# Patient Record
Sex: MALE | Race: WHITE | NOT HISPANIC OR LATINO | Employment: OTHER | URBAN - NONMETROPOLITAN AREA
[De-identification: names, ages, dates, MRNs, and addresses within clinical notes are randomized per-mention and may not be internally consistent; named-entity substitution may affect disease eponyms.]

---

## 2025-03-15 ENCOUNTER — APPOINTMENT (OUTPATIENT)
Dept: RADIOLOGY | Facility: HOSPITAL | Age: 65
End: 2025-03-15
Payer: MEDICARE

## 2025-03-15 ENCOUNTER — HOSPITAL ENCOUNTER (EMERGENCY)
Facility: HOSPITAL | Age: 65
Discharge: HOME | End: 2025-03-16
Attending: STUDENT IN AN ORGANIZED HEALTH CARE EDUCATION/TRAINING PROGRAM
Payer: MEDICARE

## 2025-03-15 ENCOUNTER — OFFICE VISIT (OUTPATIENT)
Dept: URGENT CARE | Age: 65
End: 2025-03-15
Payer: MEDICARE

## 2025-03-15 VITALS
TEMPERATURE: 97.9 F | OXYGEN SATURATION: 98 % | HEIGHT: 74 IN | BODY MASS INDEX: 21.82 KG/M2 | HEART RATE: 58 BPM | DIASTOLIC BLOOD PRESSURE: 69 MMHG | RESPIRATION RATE: 14 BRPM | WEIGHT: 170 LBS | SYSTOLIC BLOOD PRESSURE: 123 MMHG

## 2025-03-15 VITALS
HEIGHT: 74 IN | WEIGHT: 170 LBS | DIASTOLIC BLOOD PRESSURE: 87 MMHG | HEART RATE: 65 BPM | BODY MASS INDEX: 21.82 KG/M2 | SYSTOLIC BLOOD PRESSURE: 127 MMHG | OXYGEN SATURATION: 100 % | TEMPERATURE: 98.3 F | RESPIRATION RATE: 18 BRPM

## 2025-03-15 DIAGNOSIS — S09.90XA INJURY OF HEAD, INITIAL ENCOUNTER: Primary | ICD-10-CM

## 2025-03-15 DIAGNOSIS — S09.90XA CLOSED HEAD INJURY, INITIAL ENCOUNTER: Primary | ICD-10-CM

## 2025-03-15 PROCEDURE — 70450 CT HEAD/BRAIN W/O DYE: CPT | Performed by: RADIOLOGY

## 2025-03-15 PROCEDURE — 90471 IMMUNIZATION ADMIN: CPT | Performed by: HEALTH CARE PROVIDER

## 2025-03-15 PROCEDURE — 12011 RPR F/E/E/N/L/M 2.5 CM/<: CPT | Performed by: HEALTH CARE PROVIDER

## 2025-03-15 PROCEDURE — 90715 TDAP VACCINE 7 YRS/> IM: CPT | Performed by: HEALTH CARE PROVIDER

## 2025-03-15 PROCEDURE — 70450 CT HEAD/BRAIN W/O DYE: CPT

## 2025-03-15 PROCEDURE — 2500000004 HC RX 250 GENERAL PHARMACY W/ HCPCS (ALT 636 FOR OP/ED): Performed by: HEALTH CARE PROVIDER

## 2025-03-15 PROCEDURE — 99284 EMERGENCY DEPT VISIT MOD MDM: CPT | Mod: 25 | Performed by: STUDENT IN AN ORGANIZED HEALTH CARE EDUCATION/TRAINING PROGRAM

## 2025-03-15 PROCEDURE — 99213 OFFICE O/P EST LOW 20 MIN: CPT

## 2025-03-15 PROCEDURE — 1159F MED LIST DOCD IN RCRD: CPT

## 2025-03-15 PROCEDURE — 1160F RVW MEDS BY RX/DR IN RCRD: CPT

## 2025-03-15 RX ORDER — LIDOCAINE HYDROCHLORIDE AND EPINEPHRINE 10; 10 UG/ML; MG/ML
INJECTION, SOLUTION INFILTRATION; PERINEURAL
Status: DISCONTINUED
Start: 2025-03-15 | End: 2025-03-16 | Stop reason: HOSPADM

## 2025-03-15 RX ADMIN — TETANUS TOXOID, REDUCED DIPHTHERIA TOXOID AND ACELLULAR PERTUSSIS VACCINE, ADSORBED 0.5 ML: 5; 2.5; 8; 8; 2.5 SUSPENSION INTRAMUSCULAR at 16:51

## 2025-03-15 ASSESSMENT — ENCOUNTER SYMPTOMS
SHORTNESS OF BREATH: 0
LOSS OF SENSATION IN FEET: 0
FATIGUE: 0
VOMITING: 0
LIGHT-HEADEDNESS: 0
WOUND: 1
SORE THROAT: 0
DEPRESSION: 0
COUGH: 0
FEVER: 0
DIARRHEA: 0
FACIAL ASYMMETRY: 0
WEAKNESS: 0
DIZZINESS: 0
HEADACHES: 0
OCCASIONAL FEELINGS OF UNSTEADINESS: 0
NAUSEA: 1
NUMBNESS: 0
CHILLS: 0
EYE PAIN: 0

## 2025-03-15 ASSESSMENT — PAIN - FUNCTIONAL ASSESSMENT
PAIN_FUNCTIONAL_ASSESSMENT: 0-10
PAIN_FUNCTIONAL_ASSESSMENT: 0-10

## 2025-03-15 ASSESSMENT — PAIN SCALES - GENERAL
PAINLEVEL_OUTOF10: 0 - NO PAIN
PAINLEVEL_OUTOF10: 0 - NO PAIN
PAINLEVEL_OUTOF10: 3
PAINLEVEL_OUTOF10: 0 - NO PAIN

## 2025-03-15 ASSESSMENT — PAIN DESCRIPTION - LOCATION: LOCATION: HEAD

## 2025-03-15 ASSESSMENT — COLUMBIA-SUICIDE SEVERITY RATING SCALE - C-SSRS
1. IN THE PAST MONTH, HAVE YOU WISHED YOU WERE DEAD OR WISHED YOU COULD GO TO SLEEP AND NOT WAKE UP?: NO
2. HAVE YOU ACTUALLY HAD ANY THOUGHTS OF KILLING YOURSELF?: NO
6. HAVE YOU EVER DONE ANYTHING, STARTED TO DO ANYTHING, OR PREPARED TO DO ANYTHING TO END YOUR LIFE?: NO

## 2025-03-15 ASSESSMENT — PAIN DESCRIPTION - DESCRIPTORS: DESCRIPTORS: SORE

## 2025-03-15 ASSESSMENT — PAIN DESCRIPTION - ORIENTATION: ORIENTATION: LEFT

## 2025-03-15 NOTE — ED TRIAGE NOTES
Pt states he had a few beers last night, and woke up around 0530 this AM, tripped over one of his tall bar chairs on the way to the bathroom. Pt believes he hit his head on the bar stool. Pt has laceration to L forehead and L ear. Pt states having no LOC. Pt is not on any blood thinners

## 2025-03-15 NOTE — PROGRESS NOTES
"Subjective   Patient ID: Ifeanyi Iraheta is a 65 y.o. male. They present today with a chief complaint of Injury (Tripped over chair this morning and hit head).    History of Present Illness  Patient is a 65-year-old male presenting to the clinic with complaints of a head injury.  Patient states that around 2:30 AM he was at the bar.  Patient states he had \"a couple drinks\".  Patient states he left the bar at around 2:30 AM made it home and around 3 AM.  Patient states that he was watching TV while sitting on a chair in the kitchen.  Patient states he fell asleep in the chair for around 2 hours.  Patient states he was abruptly woken up at around 5 AM with nausea.  Patient states he tried to run to the bathroom and had a mechanical fall over the stool and hit his head on one of the stool chairs.  Patient has a laceration about an inch and a half over the left forehead.  Patient did have some bleeding that is now clotted off.  No active bleeding in the clinic.  Patient denies any headache or dizziness.  Patient believes it was a mechanical fall.  Patient denies any passing out.  Patient denies any numbness, tingling or weakness of the arms or legs.  Patient denies any visual dysfunction.      History provided by:  Patient  Injury  Associated symptoms: nausea    Associated symptoms: no chest pain, no cough, no diarrhea, no fatigue, no fever, no headaches, no rash, no shortness of breath, no sore throat and no vomiting        Past Medical History  Allergies as of 03/15/2025    (No Known Allergies)       (Not in a hospital admission)       History reviewed. No pertinent past medical history.    History reviewed. No pertinent surgical history.     reports that he has never smoked. He has never used smokeless tobacco. He reports current alcohol use. He reports that he does not use drugs.    Review of Systems  Review of Systems   Constitutional:  Negative for chills, fatigue and fever.   HENT:  Negative for ear discharge " "and sore throat.    Eyes:  Negative for pain and visual disturbance.   Respiratory:  Negative for cough and shortness of breath.    Cardiovascular:  Negative for chest pain.   Gastrointestinal:  Positive for nausea. Negative for diarrhea and vomiting.   Skin:  Positive for wound. Negative for rash.   Neurological:  Negative for dizziness, syncope, facial asymmetry, weakness, light-headedness, numbness and headaches.                                  Objective    Vitals:    03/15/25 1453   BP: 123/69   BP Location: Left arm   Patient Position: Sitting   BP Cuff Size: Adult   Pulse: 58   Resp: 14   Temp: 36.6 °C (97.9 °F)   TempSrc: Oral   SpO2: 98%   Weight: 77.1 kg (170 lb)   Height: 1.88 m (6' 2\")     No LMP for male patient.    Physical Exam  Vitals reviewed.   Constitutional:       General: He is not in acute distress.     Appearance: Normal appearance. He is normal weight. He is not ill-appearing or toxic-appearing.   HENT:      Head:      Comments: No signs of acute skull fracture.  There does appear to be an inch to an inch and a half laceration over the forehead.  No Monk sign.  No raccoon sign.  No signs of acute skull fracture.     Right Ear: Tympanic membrane, ear canal and external ear normal.      Left Ear: Tympanic membrane, ear canal and external ear normal.      Mouth/Throat:      Mouth: Mucous membranes are moist.      Pharynx: Oropharynx is clear. No oropharyngeal exudate or posterior oropharyngeal erythema.   Eyes:      Extraocular Movements: Extraocular movements intact.      Conjunctiva/sclera: Conjunctivae normal.      Pupils: Pupils are equal, round, and reactive to light.   Cardiovascular:      Rate and Rhythm: Normal rate and regular rhythm.      Heart sounds: Normal heart sounds. No murmur heard.     No friction rub. No gallop.   Pulmonary:      Effort: Pulmonary effort is normal. No respiratory distress.      Breath sounds: Normal breath sounds. No stridor. No wheezing, rhonchi or rales. " "  Neurological:      General: No focal deficit present.      Mental Status: He is alert and oriented to person, place, and time. Mental status is at baseline.      Cranial Nerves: No cranial nerve deficit.      Sensory: No sensory deficit.      Motor: No weakness.      Coordination: Coordination normal.      Gait: Gait normal.   Psychiatric:         Mood and Affect: Mood normal.         Behavior: Behavior normal.         Procedures    Point of Care Test & Imaging Results from this visit  No results found for this visit on 03/15/25.   No results found.    Diagnostic study results (if any) were reviewed by [facility].    Assessment/Plan   Allergies, medications, history, and pertinent labs/EKGs/Imaging reviewed by Bijan Ross PA-C.     Medical Decision Making  Patient is a 65-year-old male presenting to the clinic with complaints of a head injury.  Patient states that around 2:30 AM he was at the bar.  Patient states he had \"a couple drinks\".  Patient states he left the bar at around 2:30 AM made it home and around 3 AM.  Patient states that he was watching TV while sitting on a chair in the kitchen.  Patient states he fell asleep in the chair for around 2 hours.  Patient states he was abruptly woken up at around 5 AM with nausea.  Patient states he tried to run to the bathroom and had a mechanical fall over the stool and hit his head on one of the stool chairs.  Patient has a laceration about an inch and a half over the left forehead.  Patient did have some bleeding that is now clotted off.  No active bleeding in the clinic.  Patient denies any headache or dizziness.  Patient believes it was a mechanical fall.  Patient denies any passing out.  Patient denies any numbness, tingling or weakness of the arms or legs.  Patient denies any visual dysfunction.  Vital signs in the clinic are stable and within normal limits.  Physical examination patient does have an inch and a half laceration over the forehead.  " No neck pain. Full range of motion of the upper and lower extremities with normal  strength and normal strength of the lower extremities.  Patient is able to tell me the month, the year, the president, what he ate for breakfast.  Patient appears neurovascularly intact.  Physical examination as above.  Attending physician was consulted on patient case.  According to MD Ibarra and Swisher head CT criteria over the age of 65 it is recommended that he have a CT scan of his head.  I am also concerned that at the time patient was drinking.  This does have some blood thinning activity as well as patient's story may be slightly different from what is corroborated if he was actively under the influence.  Attending physician agrees patient requires emergency room evaluation.  Patient was sent via private vehicle to [place] emergency room for CT scan of scalp as well as laceration repair.    Orders and Diagnoses  There are no diagnoses linked to this encounter.    Medical Admin Record      Patient disposition: ED    Electronically signed by [facility]  3:05 PM

## 2025-03-15 NOTE — ED PROVIDER NOTES
HPI   Chief Complaint   Patient presents with    Fall    Head Laceration     Pt states he had a few beers last night, and woke up around 0530 this AM, tripped over one of his tall bar chairs on the way to the bathroom. Pt believes he hit his head on the bar stool. Pt has laceration to L forehead and L ear. Pt states having no LOC. Pt is not on any blood thinners       CC: head injury  HPI:   65-year-old male presents ED after he was seen at urgent care and directed to come to the emergency room for head injury.  Patient stated this occurred around 530 this morning he does report having drinking alcohol last night, he denies any loss of consciousness, he reports when he woke up around 530 he tripped over a stool and hit his head does not take any long-term anticoagulant or antiplatelet medications he denies any headache, dizziness or cervical neck tenderness denies any upper/lower extremity weakness numbness pain or paresthesia, denies any visual acuity changes, denies having any nausea vomiting, does not feel off balance.    Additional Limitations to History:   External Records Reviewed: I reviewed recent and relevant outside records including   History Obtained From:     Past Medical History: Per HPI  Medications: Reviewed in EMR and with patient  Allergies:  Reviewed in EMR  Past Surgical History:   Social History:     ------------------------------------------------------------------------------------------------------  Physical Exam:  --Vital signs reviewed in nursing triage note, EMR flow sheets, and at patient's bedside  GEN:  A&Ox3, no acute distress, appears comfortable.  Conversational and appropriate.  No confusion or gross mental status changes.  EYES: EOMI, non-injected sclera.  ENT: Moist mucous membranes, no apparent injuries or lesions.   CARDIO: Normal rate and regular rhythm. No murmurs, rubs, or gallops.  2+ equal pulses of the distal extremities.   PULM: Clear to auscultation bilaterally. No  rales, rhonchi, or wheezes. Good symmetric chest expansion.  GI: Soft, non-tender, non-distended. No rebound tenderness or guarding.  MSK: ROM intact the extremities without contractures.   EXT: No peripheral edema, contusions, or wounds.   NEURO: Cranial nerves II-XII grossly intact. Sensation to light touch intact and equal bilaterally in upper and lower extremities.  Symmetric 5/5 strength in upper and lower extremities.  PSYCH: Appropriate mood and behavior, converses and responds appropriately during exam.  -------------------------------------------------------------------------------------------------------      Differential Diagnoses Considered:   Chronic Medical Conditions Significantly Affecting Care:   Diagnostic testing considered: [PERC, D-Dimer, PECARN, etc.]      - I independently interpreted: [CXR, CT, POCUS, etc. including your interpretation]  - Labs notable for     Escalation of Care: Appropriate for   Social Determinants of Health Significantly Affecting Care: [Homelessness, lacking transportation, uninsured, unable to afford medications]  Prescription Drug Consideration: [Antibiotics, antivirals, pain medications, etc.]  Discussion of Management with Other Providers:  I discussed the patient/results with: [admitting team, consultant, radiologist, social work, EPAT, case management, PT/OT, RT, PCP, etc.]      Magen Rm PA-C              Patient History   History reviewed. No pertinent past medical history.  History reviewed. No pertinent surgical history.  No family history on file.  Social History     Tobacco Use    Smoking status: Never    Smokeless tobacco: Never   Substance Use Topics    Alcohol use: Yes    Drug use: Never       Physical Exam   ED Triage Vitals [03/15/25 1612]   Temperature Heart Rate Respirations BP   36.1 °C (97 °F) 57 16 151/75      Pulse Ox Temp Source Heart Rate Source Patient Position   99 % Temporal -- --      BP Location FiO2 (%)     -- --       Physical  Exam  HENT:      Head:        Comments: There is a 3 cm long 0.5 cm wide by 4 mm deep laceration over the left side of his forehead, no active bleeding, no foreign bodies          ED Course & [facility]   ED Course as of 03/15/25 1737   Sat Mar 15, 2025   1719 Spoke with Dr. Alexis, Neurosurgery, discussed the case over the phone, they recommend 6 hour stability scan and reevaluation, potential for discharge home  []      ED Course User Index  [] Joann Nicholas,                  No data recorded     Mani Coma Scale Score: 15 (03/15/25 1614 : Carli Falcon RN)                           Medical Decision Making  65-year-old male with acute injury that occurred early this morning, patient is neurologically intact he medically stable, nontoxic, no neurodeficits noted on examination, CT of the head showed a small  parenchymal contusion to the bilateral anterior frontal lobes with trace left acute subdural hematoma.  Discussed findings with neurosurgery at [facility], plan is to observe patient for approximately 6 hours repeat CT reassess        Procedure  Laceration Repair    Performed by: Magen Rm PA-C  Authorized by: Magen Rm PA-C    Consent:     Consent obtained:  Verbal    Consent given by:  Patient    Risks, benefits, and alternatives were discussed: yes      Risks discussed:  Infection and poor cosmetic result  Universal protocol:     Patient identity confirmed:  Verbally with patient  Anesthesia:     Anesthesia method:  Local infiltration    Local anesthetic:  Lidocaine 1% WITH epi  Laceration details:     Location:  Face    Face location:  Forehead    Length (cm):  2    Depth (mm):  4  Pre-procedure details:     Preparation:  Patient was prepped and draped in usual sterile fashion  Exploration:     Limited defect created (wound extended): no      Hemostasis achieved with:  Direct pressure  Treatment:     Area cleansed with:  Chlorhexidine    Amount of cleaning:  Standard    Debridement:   None    Undermining:  None    Scar revision: no    Skin repair:     Repair method:  Sutures    Suture size:  5-0    Suture material:  Prolene    Suture technique:  Simple interrupted    Number of sutures:  3  Approximation:     Approximation:  Loose  Repair type:     Repair type:  Simple  Post-procedure details:     Dressing:  Non-adherent dressing    Procedure completion:  Tolerated       Magen Rm PA-C  03/15/25 1627       Magen Rm PA-C  03/15/25 1709       Magen Rm PA-C  03/15/25 1749

## 2025-03-16 NOTE — PROGRESS NOTES
Emergency Medicine Transition of Care Note.    I received Ifeanyi Iraheta in signout from Ron Rm please see the previous ED provider note for all HPI, PE and MDM up to the time of signout at 2100. This is in addition to the primary record.    In brief Ifeanyi Iraheta is an 65 y.o. male presenting for   Chief Complaint   Patient presents with    Fall    Head Laceration     Pt states he had a few beers last night, and woke up around 0530 this AM, tripped over one of his tall bar chairs on the way to the bathroom. Pt believes he hit his head on the bar stool. Pt has laceration to L forehead and L ear. Pt states having no LOC. Pt is not on any blood thinners     At the time of signout we were awaiting: Repeat CT head    ED Course as of 03/15/25 2138   Sat Mar 15, 2025   1719 Spoke with Dr. Alexis, Neurosurgery, discussed the case over the phone, they recommend 6 hour stability scan and reevaluation, potential for discharge home  [JH]      ED Course User Index  [JH] Joann Nicholas, DO       Medical Decision Making  I received this patient in handoff at shift change from Ron Rm, my midlevel.  Patient had a fall earlier in the day and is on no blood thinners.  CT initially showed a very small area of bleed.  We had reached out to neurosurgery at main [place] who recommends a 6-hour CT to check for any extension of the bleeding.  If no change, he will be able to go home but if there is any increase in the amount of blood, we will reach out and speak with neurosurgery again.        Final diagnoses:   None           Procedure  Procedures    Wilman Ramos MD

## 2025-03-16 NOTE — DISCHARGE INSTRUCTIONS
Repeat CAT scan of head shows less of the small collection of blood seen on the first CAT scan.  Our neurosurgery department is stated that if this small bleed improves or stays the same, but does not worsen, you should be able to go home with outpatient follow-up.

## 2025-04-09 ENCOUNTER — APPOINTMENT (OUTPATIENT)
Dept: PRIMARY CARE | Facility: CLINIC | Age: 65
End: 2025-04-09
Payer: COMMERCIAL

## 2025-04-09 VITALS
DIASTOLIC BLOOD PRESSURE: 82 MMHG | BODY MASS INDEX: 20.92 KG/M2 | SYSTOLIC BLOOD PRESSURE: 116 MMHG | OXYGEN SATURATION: 98 % | TEMPERATURE: 98 F | WEIGHT: 163 LBS | HEART RATE: 54 BPM | HEIGHT: 74 IN

## 2025-04-09 DIAGNOSIS — N52.9 ERECTILE DYSFUNCTION, UNSPECIFIED ERECTILE DYSFUNCTION TYPE: ICD-10-CM

## 2025-04-09 DIAGNOSIS — Z12.12 ENCOUNTER FOR COLORECTAL CANCER SCREENING: ICD-10-CM

## 2025-04-09 DIAGNOSIS — Z91.89 HAS MULTIPLE SEXUAL PARTNERS: ICD-10-CM

## 2025-04-09 DIAGNOSIS — Z13.1 SCREENING FOR DIABETES MELLITUS: ICD-10-CM

## 2025-04-09 DIAGNOSIS — F10.90 ALCOHOL USE: ICD-10-CM

## 2025-04-09 DIAGNOSIS — Z00.00 ROUTINE GENERAL MEDICAL EXAMINATION AT A HEALTH CARE FACILITY: ICD-10-CM

## 2025-04-09 DIAGNOSIS — R79.89 ABNORMAL CBC: ICD-10-CM

## 2025-04-09 DIAGNOSIS — R31.0 GROSS HEMATURIA: ICD-10-CM

## 2025-04-09 DIAGNOSIS — Z12.5 SCREENING FOR PROSTATE CANCER: ICD-10-CM

## 2025-04-09 DIAGNOSIS — Z13.220 LIPID SCREENING: Primary | ICD-10-CM

## 2025-04-09 DIAGNOSIS — Z12.11 ENCOUNTER FOR COLORECTAL CANCER SCREENING: ICD-10-CM

## 2025-04-09 DIAGNOSIS — R79.9 ABNORMAL FINDING OF BLOOD CHEMISTRY, UNSPECIFIED: ICD-10-CM

## 2025-04-09 PROCEDURE — 99213 OFFICE O/P EST LOW 20 MIN: CPT | Performed by: STUDENT IN AN ORGANIZED HEALTH CARE EDUCATION/TRAINING PROGRAM

## 2025-04-09 PROCEDURE — 1159F MED LIST DOCD IN RCRD: CPT | Performed by: STUDENT IN AN ORGANIZED HEALTH CARE EDUCATION/TRAINING PROGRAM

## 2025-04-09 PROCEDURE — 1126F AMNT PAIN NOTED NONE PRSNT: CPT | Performed by: STUDENT IN AN ORGANIZED HEALTH CARE EDUCATION/TRAINING PROGRAM

## 2025-04-09 PROCEDURE — 99387 INIT PM E/M NEW PAT 65+ YRS: CPT | Performed by: STUDENT IN AN ORGANIZED HEALTH CARE EDUCATION/TRAINING PROGRAM

## 2025-04-09 PROCEDURE — 3008F BODY MASS INDEX DOCD: CPT | Performed by: STUDENT IN AN ORGANIZED HEALTH CARE EDUCATION/TRAINING PROGRAM

## 2025-04-09 RX ORDER — SILDENAFIL 100 MG/1
100 TABLET, FILM COATED ORAL DAILY PRN
Qty: 12 TABLET | Refills: 3 | Status: SHIPPED | OUTPATIENT
Start: 2025-04-09 | End: 2026-04-09

## 2025-04-09 ASSESSMENT — ENCOUNTER SYMPTOMS
SHORTNESS OF BREATH: 0
UNEXPECTED WEIGHT CHANGE: 0
FATIGUE: 0
APPETITE CHANGE: 0
DYSURIA: 0
CONSTIPATION: 0
DIFFICULTY URINATING: 1
FREQUENCY: 0
ACTIVITY CHANGE: 0

## 2025-04-09 ASSESSMENT — PATIENT HEALTH QUESTIONNAIRE - PHQ9
1. LITTLE INTEREST OR PLEASURE IN DOING THINGS: NOT AT ALL
SUM OF ALL RESPONSES TO PHQ9 QUESTIONS 1 AND 2: 0
2. FEELING DOWN, DEPRESSED OR HOPELESS: NOT AT ALL

## 2025-04-09 ASSESSMENT — PAIN SCALES - GENERAL: PAINLEVEL_OUTOF10: 0-NO PAIN

## 2025-04-09 NOTE — PROGRESS NOTES
"Ifeanyi Iraheta is a 65 y.o. male who is presenting for New Patient Visit and Establish Care (Pt is here to establish care, would like to check prostate health)    Patient is here to re-establish care after a 7 year period with no regular health maintenance. Patient's main concern today is a history of erectile dysfunction detailed below.     Had some blood in the urine in 2022 after night of binge drinking. Also had some blood with urination this past winter after using his hand to stop his stream whilst urinating that resolved after a couple days.     Patient wants to get lab work. Patient wants to get his PSA checked. Would like A1c.     Complains of intermittent foot swelling and tingling/numbness in feet.     Follows with Derm due to family Hx of skin cancer in farther    Last  Visit: 2015 note, patient mentions 2018 physical  Reported Health: Excellent    Dental, Vision, Hearing:  Regular dental visits: yes  - Brushes teeth 2 times per day  - Flosses? yes (1/week)  Vision problems: yes  - Wears glasses or contacts? yes  - Last eye exam:  2023  Hearing loss: yes (Patient is unsure but he reports that he has needed others to repeat themselves more)    Immunization status:  Up to date: no  -Tdap 03/15/2025  -Shingles: Have had shingles -> Can still get shingrix   -Pneumo: No  -Flu: 02/09/2025  -Covid: Not needed    Lifestyle:  Healthy diet: yes  Regular exercise: yes  - Exercise frequency: 3/week  - Type of exercise: Pullups, pushups, etc.   Alcohol: yes (4/week)   Tobacco: no  Drugs: yes (marijuana smoking and edibles)    Reproductive Health:  Sexually active: yes Multiple (3 in the past year)  Contraception: No   Erectile dysfunction: yes  -Scheduled for urologist end of April 2025  -Patient is now struggling to achieve erection and maintain erection  -Patient has noticeably less morning erections and random erections  -\"Kinks\" in penis   -Still sometimes can complete sex though this is irregular for " "him    -STI screen: interested     Colorectal Cancer Screening:  Last colonoscopy or Cologuard:  2013  -Will order Colonoscopy     Prostate Cancer Screening:  Last PSA:  Thinks PSA was high in 2018 at physical  -Will order  Metabolic Screening:  Lipid profile: Will order  Glucose screen: Will Order    Review of Systems   Constitutional:  Negative for activity change, appetite change, fatigue and unexpected weight change.   HENT:  Negative for hearing loss.    Eyes:  Negative for visual disturbance.   Respiratory:  Negative for shortness of breath.    Cardiovascular:  Negative for chest pain.   Gastrointestinal:  Negative for constipation.   Genitourinary:  Positive for difficulty urinating. Negative for dysuria, frequency, penile pain, testicular pain and urgency.       Previous History  Past Medical History:   Diagnosis Date    Erectile dysfunction January 5, 2024         History reviewed. No pertinent surgical history.  Social History     Tobacco Use    Smoking status: Never    Smokeless tobacco: Never   Substance Use Topics    Alcohol use: Yes     Alcohol/week: 7.0 standard drinks of alcohol     Types: 3 Cans of beer, 2 Shots of liquor, 2 Standard drinks or equivalent per week    Drug use: Never     Family History   Problem Relation Name Age of Onset    Skin cancer Father       No Known Allergies  Current Outpatient Medications   Medication Instructions    sildenafil (VIAGRA) 100 mg, oral, Daily PRN       Visit Vitals  /82   Pulse 54   Temp 36.7 °C (98 °F)   Ht 1.88 m (6' 2\")   Wt 73.9 kg (163 lb)   SpO2 98%   BMI 20.93 kg/m²   Smoking Status Never   BSA 1.96 m²       Physical Exam  Constitutional:       Appearance: Normal appearance. He is normal weight.   HENT:      Head: Normocephalic and atraumatic.      Right Ear: External ear normal.      Left Ear: External ear normal.      Nose: Nose normal.   Eyes:      Conjunctiva/sclera: Conjunctivae normal.   Cardiovascular:      Rate and Rhythm: Normal rate and " regular rhythm.      Pulses: Normal pulses.      Heart sounds: Normal heart sounds.   Pulmonary:      Effort: Pulmonary effort is normal.      Breath sounds: Normal breath sounds.   Abdominal:      General: Abdomen is flat.      Palpations: Abdomen is soft.   Skin:     General: Skin is warm and dry.      Capillary Refill: Capillary refill takes less than 2 seconds.   Neurological:      Mental Status: He is alert and oriented to person, place, and time.   Psychiatric:         Mood and Affect: Mood normal.         Behavior: Behavior normal.         Assessment & Plan  Screening for prostate cancer    Orders:    Prostate Specific Antigen, Screen; Future    Lipid screening    Orders:    CBC and Auto Differential; Future    Comprehensive Metabolic Panel; Future    Lipid Panel; Future    Erectile dysfunction, unspecified erectile dysfunction type    Orders:    Hemoglobin A1c; Future    sildenafil (Viagra) 100 mg tablet; Take 1 tablet (100 mg) by mouth once daily as needed for erectile dysfunction.    Routine general medical examination at a health care facility       immunizations reviewed, due for shingles, declined today.   Cancer screenings reviewed - orders placed to update as appropriate.   Reviewed healthy diet and exercise, patient remains physically active.    Substance history reviewed - patient with reported light alcohol use.    Sexual practices reviewed - multiple partners - will obtain STI testing.   Has multiple sexual partners    Orders:    HIV 1/2 Antigen/Antibody Screen with Reflex to Confirmation; Future    Syphilis Screen with Reflex; Future    Hepatitis C antibody; Future    C. trachomatis / N. gonorrhoeae, Amplified, Urogenital; Future    Trichomonas vaginalis, Amplified; Future    Screening for diabetes mellitus    Orders:    Hemoglobin A1c; Future    Encounter for colorectal cancer screening    Orders:    Colonoscopy Screening; Average Risk Patient; Future    Abnormal CBC    Orders:    CBC and Auto  Differential; Future    Alcohol use    Orders:    Hemoglobin A1c; Future    Abnormal finding of blood chemistry, unspecified    Orders:    Hemoglobin A1c; Future    Gross hematuria    Orders:    Urinalysis with Reflex Microscopic; Future      I, or a resident under my supervision, was present with the medical student who participated in the documentation of this note.  I have personally seen and examined the patient and performed the medical decision-making components. I have reviewed the medical student documentation and/or resident documentation and verified the findings in the note as written with additions or exceptions as stated in the body of the note.    Minesh Ramirez, DO

## 2025-04-30 ENCOUNTER — APPOINTMENT (OUTPATIENT)
Dept: UROLOGY | Facility: CLINIC | Age: 65
End: 2025-04-30
Payer: COMMERCIAL

## 2025-04-30 DIAGNOSIS — Z00.00 HEALTH MAINTENANCE EXAMINATION: ICD-10-CM

## 2025-04-30 DIAGNOSIS — N48.6 PEYRONIE DISEASE: ICD-10-CM

## 2025-04-30 DIAGNOSIS — N52.9 ERECTILE DYSFUNCTION, UNSPECIFIED ERECTILE DYSFUNCTION TYPE: Primary | ICD-10-CM

## 2025-04-30 LAB
ALBUMIN SERPL-MCNC: 4.2 G/DL (ref 3.6–5.1)
ALP SERPL-CCNC: 74 U/L (ref 35–144)
ALT SERPL-CCNC: 19 U/L (ref 9–46)
ANION GAP SERPL CALCULATED.4IONS-SCNC: 6 MMOL/L (CALC) (ref 7–17)
APPEARANCE UR: CLEAR
AST SERPL-CCNC: 16 U/L (ref 10–35)
BASOPHILS # BLD AUTO: 41 CELLS/UL (ref 0–200)
BASOPHILS NFR BLD AUTO: 0.7 %
BILIRUB SERPL-MCNC: 0.5 MG/DL (ref 0.2–1.2)
BILIRUB UR QL STRIP: NEGATIVE
BUN SERPL-MCNC: 22 MG/DL (ref 7–25)
C TRACH RRNA SPEC QL NAA+PROBE: NOT DETECTED
CALCIUM SERPL-MCNC: 9.2 MG/DL (ref 8.6–10.3)
CHLORIDE SERPL-SCNC: 105 MMOL/L (ref 98–110)
CHOLEST SERPL-MCNC: 159 MG/DL
CHOLEST/HDLC SERPL: 3 (CALC)
CO2 SERPL-SCNC: 29 MMOL/L (ref 20–32)
COLOR UR: YELLOW
CREAT SERPL-MCNC: 1.18 MG/DL (ref 0.7–1.35)
EGFRCR SERPLBLD CKD-EPI 2021: 68 ML/MIN/1.73M2
EOSINOPHIL # BLD AUTO: 110 CELLS/UL (ref 15–500)
EOSINOPHIL NFR BLD AUTO: 1.9 %
ERYTHROCYTE [DISTWIDTH] IN BLOOD BY AUTOMATED COUNT: 12.3 % (ref 11–15)
EST. AVERAGE GLUCOSE BLD GHB EST-MCNC: 114 MG/DL
EST. AVERAGE GLUCOSE BLD GHB EST-SCNC: 6.3 MMOL/L
GLUCOSE SERPL-MCNC: 86 MG/DL (ref 65–139)
GLUCOSE UR QL STRIP: NEGATIVE
HBA1C MFR BLD: 5.6 %
HCT VFR BLD AUTO: 44.5 % (ref 38.5–50)
HCV AB SERPL QL IA: NORMAL
HDLC SERPL-MCNC: 53 MG/DL
HGB BLD-MCNC: 14.8 G/DL (ref 13.2–17.1)
HGB UR QL STRIP: NEGATIVE
HIV 1+2 AB+HIV1 P24 AG SERPL QL IA: NORMAL
KETONES UR QL STRIP: NEGATIVE
LDLC SERPL CALC-MCNC: 93 MG/DL (CALC)
LEUKOCYTE ESTERASE UR QL STRIP: NEGATIVE
LYMPHOCYTES # BLD AUTO: 2390 CELLS/UL (ref 850–3900)
LYMPHOCYTES NFR BLD AUTO: 41.2 %
MCH RBC QN AUTO: 30.8 PG (ref 27–33)
MCHC RBC AUTO-ENTMCNC: 33.3 G/DL (ref 32–36)
MCV RBC AUTO: 92.5 FL (ref 80–100)
MONOCYTES # BLD AUTO: 545 CELLS/UL (ref 200–950)
MONOCYTES NFR BLD AUTO: 9.4 %
N GONORRHOEA RRNA SPEC QL NAA+PROBE: NOT DETECTED
NEUTROPHILS # BLD AUTO: 2714 CELLS/UL (ref 1500–7800)
NEUTROPHILS NFR BLD AUTO: 46.8 %
NITRITE UR QL STRIP: NEGATIVE
NONHDLC SERPL-MCNC: 106 MG/DL (CALC)
PH UR STRIP: 6 [PH] (ref 5–8)
PLATELET # BLD AUTO: 234 THOUSAND/UL (ref 140–400)
PMV BLD REES-ECKER: 10.3 FL (ref 7.5–12.5)
POTASSIUM SERPL-SCNC: 4.3 MMOL/L (ref 3.5–5.3)
PROT SERPL-MCNC: 6.4 G/DL (ref 6.1–8.1)
PROT UR QL STRIP: NEGATIVE
PSA SERPL-MCNC: 1.97 NG/ML
QUEST GC CT AMPLIFIED (ALWAYS MESSAGE): NORMAL
RBC # BLD AUTO: 4.81 MILLION/UL (ref 4.2–5.8)
SODIUM SERPL-SCNC: 140 MMOL/L (ref 135–146)
SP GR UR STRIP: 1.02 (ref 1–1.03)
T PALLIDUM AB SER QL IA: NEGATIVE
T VAGINALIS RRNA SPEC QL NAA+PROBE: NOT DETECTED
TRIGL SERPL-MCNC: 51 MG/DL
WBC # BLD AUTO: 5.8 THOUSAND/UL (ref 3.8–10.8)

## 2025-04-30 PROCEDURE — 1036F TOBACCO NON-USER: CPT | Performed by: UROLOGY

## 2025-04-30 PROCEDURE — 99204 OFFICE O/P NEW MOD 45 MIN: CPT | Performed by: UROLOGY

## 2025-04-30 PROCEDURE — 1159F MED LIST DOCD IN RCRD: CPT | Performed by: UROLOGY

## 2025-04-30 RX ORDER — TADALAFIL 20 MG/1
20 TABLET ORAL AS NEEDED
Qty: 30 TABLET | Refills: 6 | Status: SHIPPED | OUTPATIENT
Start: 2025-04-30

## 2025-04-30 RX ORDER — SILDENAFIL 100 MG/1
100 TABLET, FILM COATED ORAL AS NEEDED
Qty: 30 TABLET | Refills: 6 | Status: SHIPPED | OUTPATIENT
Start: 2025-04-30 | End: 2025-04-30 | Stop reason: WASHOUT

## 2025-04-30 NOTE — PROGRESS NOTES
HPI:  65 y.o. yo male patient complains of  erectile dysfunction, self referral. Also reporting PD/hourglass. Bends down, 'moderate, not able to describe degree'    Duration: worse last 6 months, PD started 6 months ago  Rigidity of erections:  gets erection but doesn't maintain/10  Morning Erections: rarely  s/p prostatectomy: no  On nitrates/NTG?: No  Smoker? no  Partner: yes  Tried PDE5is?: yes  Viagra/sildenafil - response: viagra 100, has not tried  Cialis/tadalafil- response: no  Tried penile injections: no  Libido/Desire: Good  Have been on Testosterone /anabolic steroids? Yes used in past, years ago, Dr. James.  Pain or curvature in penis when erect: Yes  Premature or delayed ejaculation:  None    Component      Latest Ref Rn 4/28/2025   WHITE BLOOD CELL COUNT      3.8 - 10.8 Thousand/uL 5.8    RED BLOOD CELL COUNT      4.20 - 5.80 Million/uL 4.81    HEMOGLOBIN      13.2 - 17.1 g/dL 14.8    HEMATOCRIT      38.5 - 50.0 % 44.5    MCV      80.0 - 100.0 fL 92.5    MCH      27.0 - 33.0 pg 30.8    MCHC      32.0 - 36.0 g/dL 33.3    RDW      11.0 - 15.0 % 12.3    PLATELET COUNT      140 - 400 Thousand/uL 234    MPV      7.5 - 12.5 fL 10.3    ABSOLUTE NEUTROPHILS      1,500 - 7,800 cells/uL 2,714    ABSOLUTE LYMPHOCYTES      850 - 3,900 cells/uL 2,390    ABSOLUTE MONOCYTES      200 - 950 cells/uL 545    ABSOLUTE EOSINOPHILS      15 - 500 cells/uL 110    ABSOLUTE BASOPHILS      0 - 200 cells/uL 41    NEUTROPHILS      % 46.8    LYMPHOCYTES      % 41.2    MONOCYTES      % 9.4    EOSINOPHILS      % 1.9    BASOPHILS      % 0.7    GLUCOSE      65 - 139 mg/dL 86    UREA NITROGEN (BUN)      7 - 25 mg/dL 22    CREATININE      0.70 - 1.35 mg/dL 1.18    EGFR      > OR = 60 mL/min/1.73m2 68    SODIUM      135 - 146 mmol/L 140    POTASSIUM      3.5 - 5.3 mmol/L 4.3    CHLORIDE      98 - 110 mmol/L 105    CARBON DIOXIDE      20 - 32 mmol/L 29    ELECTROLYTE BALANCE      7 - 17 mmol/L (calc) 6 (L)    CALCIUM      8.6 - 10.3  "mg/dL 9.2    PROTEIN, TOTAL      6.1 - 8.1 g/dL 6.4    ALBUMIN      3.6 - 5.1 g/dL 4.2    BILIRUBIN, TOTAL      0.2 - 1.2 mg/dL 0.5    ALKALINE PHOSPHATASE      35 - 144 U/L 74    AST      10 - 35 U/L 16    ALT      9 - 46 U/L 19         Lab Results   Component Value Date    PSA 1.97 04/28/2025     No components found for: \"CBC\"  No results found for: \"PROLACTIN\"  Lab Results   Component Value Date    HGBA1C 5.6 04/28/2025         PMH:  Medical History[1]     PSH:  Surgical History[2]     Medications:  Current Medications[3]    Allergy:  Allergies[4]     Exam  Testicles descended bilaterally, nontender, no masses  Vasa palpable bilaterally  Penis circ'd, no lesions, no plaques      Assessment/Plan  #Erectile Dysfunction: I discussed the etiology and different treatment modalities for erectile dysfunction. Specifically, we talked about lifestyle factors like smoking, diet, and exercise. We also discussed ED as a sign of systemic disease, and the contributions of elevated cholesterol and elevated blood sugars on erections. We then discussed treatment modalities, specifically PDE5 inhibitors, intracavernosal injections, vacuum erectile devices, and penile prostheses.    ED panel  Trial of Cialis 20mg - medication counseling done. Can start with half tab. Discussed side effects including priapism, headaches, stuffiness, and back pain. Discussed that if he gets an erection >4 hours, he needs to present to the emergency room or risk worsening of his erectile dysfunction long term.   Will schedule intracavernosal injection and penile duplex ultrasound. Discussed that this involves an injection in the penis and subsequent ultrasound of penis to measure vasculature. This may result in a prolonged erection, which may require injection of reversal agent prior to discharge. Risks of priapism, pain, scar tissue, bruising, discussed. All questions answered.    #Peyronies disease   We discussed the etiology and natural progression " of PD. Also discussed treatment options, including observation, xiaflex, plication, and excision of plaque and graft, and their risks and benefits.     -Penile Duplex Ultrasound     Fu for Doppler    Scribe Attestation  By signing my name below, I, Senia Nix Scribe   attest that this documentation has been prepared under the direction and in the presence of Mark Campos MD.           [1]   Past Medical History:  Diagnosis Date    Erectile dysfunction January 5, 2024   [2] No past surgical history on file.  [3]   Current Outpatient Medications:     sildenafil (Viagra) 100 mg tablet, Take 1 tablet (100 mg) by mouth once daily as needed for erectile dysfunction., Disp: 12 tablet, Rfl: 3  [4] No Known Allergies

## 2025-06-24 ENCOUNTER — APPOINTMENT (OUTPATIENT)
Dept: UROLOGY | Facility: CLINIC | Age: 65
End: 2025-06-24
Payer: MEDICARE

## 2025-06-24 VITALS — HEART RATE: 57 BPM | SYSTOLIC BLOOD PRESSURE: 116 MMHG | DIASTOLIC BLOOD PRESSURE: 71 MMHG

## 2025-06-24 DIAGNOSIS — N48.89 PENILE CURVATURE, ACQUIRED: ICD-10-CM

## 2025-06-24 DIAGNOSIS — N52.9 ERECTILE DYSFUNCTION, UNSPECIFIED ERECTILE DYSFUNCTION TYPE: Primary | ICD-10-CM

## 2025-06-24 DIAGNOSIS — R39.9 URINARY SYMPTOM OR SIGN: ICD-10-CM

## 2025-06-24 PROCEDURE — 99214 OFFICE O/P EST MOD 30 MIN: CPT | Performed by: UROLOGY

## 2025-06-24 PROCEDURE — 93980 PENILE VASCULAR STUDY: CPT | Performed by: UROLOGY

## 2025-06-24 PROCEDURE — 54235 NJX CORPORA CAVERNOSA RX AGT: CPT | Performed by: UROLOGY

## 2025-06-24 NOTE — PROGRESS NOTES
HPI:  65 y.o. yo male patient complains of  erectile dysfunction, self referral. Also reporting PD/hourglass. Bends down, 'moderate, not able to describe degree'    Last visit, 4/30/25:  #Erectile Dysfunction  -ED panel  -Trial of Cialis 20 mg  -Will schedule intracavernosal injection and penile duplex ultrasound.     #Peyronies disease   -Penile Duplex Ultrasound     Today's visit:  #Erectile Dysfunction  -ED labs pending  -Cialis 20 mg  -Injection seems like working; seems like he can maintain erection as long as he wants    #Peyronies disease   -here today for doppler (see a separate note)      Duration: worse last 6 months, PD started 6 months ago  Rigidity of erections:  gets erection but doesn't maintain/10  Morning Erections: rarely  s/p prostatectomy: no  On nitrates/NTG?: No  Smoker? no  Partner: yes  Tried PDE5is?: yes  Viagra/sildenafil - response: viagra 100, has not tried  Cialis/tadalafil- response: no  Tried penile injections: no  Libido/Desire: Good  Have been on Testosterone /anabolic steroids? Yes used in past, years ago, Dr. James.  Pain or curvature in penis when erect: Yes  Premature or delayed ejaculation:  None    Component      Latest Ref Rng 4/28/2025   WHITE BLOOD CELL COUNT      3.8 - 10.8 Thousand/uL 5.8    RED BLOOD CELL COUNT      4.20 - 5.80 Million/uL 4.81    HEMOGLOBIN      13.2 - 17.1 g/dL 14.8    HEMATOCRIT      38.5 - 50.0 % 44.5    MCV      80.0 - 100.0 fL 92.5    MCH      27.0 - 33.0 pg 30.8    MCHC      32.0 - 36.0 g/dL 33.3    RDW      11.0 - 15.0 % 12.3    PLATELET COUNT      140 - 400 Thousand/uL 234    MPV      7.5 - 12.5 fL 10.3    ABSOLUTE NEUTROPHILS      1,500 - 7,800 cells/uL 2,714    ABSOLUTE LYMPHOCYTES      850 - 3,900 cells/uL 2,390    ABSOLUTE MONOCYTES      200 - 950 cells/uL 545    ABSOLUTE EOSINOPHILS      15 - 500 cells/uL 110    ABSOLUTE BASOPHILS      0 - 200 cells/uL 41    NEUTROPHILS      % 46.8    LYMPHOCYTES      % 41.2    MONOCYTES      % 9.4   "  EOSINOPHILS      % 1.9    BASOPHILS      % 0.7    GLUCOSE      65 - 139 mg/dL 86    UREA NITROGEN (BUN)      7 - 25 mg/dL 22    CREATININE      0.70 - 1.35 mg/dL 1.18    EGFR      > OR = 60 mL/min/1.73m2 68    SODIUM      135 - 146 mmol/L 140    POTASSIUM      3.5 - 5.3 mmol/L 4.3    CHLORIDE      98 - 110 mmol/L 105    CARBON DIOXIDE      20 - 32 mmol/L 29    ELECTROLYTE BALANCE      7 - 17 mmol/L (calc) 6 (L)    CALCIUM      8.6 - 10.3 mg/dL 9.2    PROTEIN, TOTAL      6.1 - 8.1 g/dL 6.4    ALBUMIN      3.6 - 5.1 g/dL 4.2    BILIRUBIN, TOTAL      0.2 - 1.2 mg/dL 0.5    ALKALINE PHOSPHATASE      35 - 144 U/L 74    AST      10 - 35 U/L 16    ALT      9 - 46 U/L 19         Lab Results   Component Value Date    PSA 1.97 04/28/2025     No components found for: \"CBC\"  No results found for: \"PROLACTIN\"  Lab Results   Component Value Date    HGBA1C 5.6 04/28/2025         PMH:  Medical History[1]     PSH:  Surgical History[2]     Medications:  Current Medications[3]    Allergy:  Allergies[4]     Exam  Testicles descended bilaterally, nontender, no masses  Vasa palpable bilaterally  Penis circ'd, no lesions, no plaques  Grade 1 tunica albuginea integrity; Peyronie’s plaque present.  Ventral curvature 35 degrees downward      Assessment/Plan  #Erectile Dysfunction:  -ED panel  -continue Cialis 20mg    #Peyronies disease   -Doppler performed today  -Discussed risk, benefits, and alternatives for penile plication to fix the ventral curvature.    Reviewed penile plication in detail, including r/b/a of the procedure.   Given mild curvature and risk of shortening, pt will continue to observe for now.     Fu on video visit with ED labs prior, pt will contact us for fu      Scribe Attestation  By signing my name below, I, Leatha Nicolas , Scribe   attest that this documentation has been prepared under the direction and in the presence of Mark Campos MD.         [1]   Past Medical History:  Diagnosis Date    Erectile " dysfunction January 5, 2024   [2] No past surgical history on file.  [3]   Current Outpatient Medications:     tadalafil 20 mg tablet, Take 1 tablet (20 mg) by mouth if needed for erectile dysfunction (Start with half tab. Take 2 hours prior to wanting erection.If you get an erection over 4 hours, go to the emergency room.)., Disp: 30 tablet, Rfl: 6  [4] No Known Allergies

## 2025-06-24 NOTE — PROGRESS NOTES
Procedure:  Intracavernosal injection   Penile ultrasound/gray scale   Penile duplex Doppler ultrasound     Indication: Erectile dysfunction/ Peyronies Disease    Penile Ultrasound: Morphologic and gray scale evaluation of the penis     1.- Tunical integrity:    Normal: smooth, continuous with thickness < 2 mm   Peyronie’s plaque ventrally    2.- Vascular integrity:     Grade 1: continuous and smooth vessel wall     3.- Erectile tissue integrity:   Normal; homogenous throughout   Fibrotic; patchy hypoechoic areas or coalesced central hypoechoic defect   Punctate or diffuse hyperechoic area   Calcified lesions: coalesced hyperechoic with back shadowing     Medication:     10 units of Trimix # 5      Penile rigidity:  80 %   Penile curvature: 35 degrees downwards  Phenylephrine: none     Penile duplex Doppler ultrasound:        Right   Left   Cavernosal artery diameters:         0.89 mm              0.76 mm   Peak systolic velocities:   24.9 cm/sec                        22.9 cm/sec    End diastolic velocities:   6.53 cm/sec             5.94 cm/sec       Impression:     Peyronie's disease     Plan:   Reviewed penile plication in detail, including r/b/a of the procedure.   Given mild curvature and risk of shortening, pt will continue to observe for now.     The patient was also counseled extensively that today or while on injections If he develops a priapism / erection over 4 hours he was ask to return to the office or to the emergency room immediately. Educational material was provided and all his questions and concerns were addressed.